# Patient Record
Sex: MALE | Race: ASIAN | URBAN - METROPOLITAN AREA
[De-identification: names, ages, dates, MRNs, and addresses within clinical notes are randomized per-mention and may not be internally consistent; named-entity substitution may affect disease eponyms.]

---

## 2017-06-06 ENCOUNTER — OFFICE VISIT (OUTPATIENT)
Dept: INTERNAL MEDICINE CLINIC | Facility: CLINIC | Age: 27
End: 2017-06-06

## 2017-06-06 VITALS
DIASTOLIC BLOOD PRESSURE: 78 MMHG | HEART RATE: 62 BPM | TEMPERATURE: 98 F | WEIGHT: 180 LBS | BODY MASS INDEX: 28.25 KG/M2 | SYSTOLIC BLOOD PRESSURE: 114 MMHG | HEIGHT: 67 IN

## 2017-06-06 DIAGNOSIS — Z01.89 LABORATORY TEST: ICD-10-CM

## 2017-06-06 DIAGNOSIS — J30.1 SEASONAL ALLERGIC RHINITIS DUE TO POLLEN: ICD-10-CM

## 2017-06-06 DIAGNOSIS — J39.2 THROAT IRRITATION: ICD-10-CM

## 2017-06-06 DIAGNOSIS — Z00.00 ANNUAL PHYSICAL EXAM: Primary | ICD-10-CM

## 2017-06-06 PROCEDURE — 99385 PREV VISIT NEW AGE 18-39: CPT | Performed by: INTERNAL MEDICINE

## 2017-06-06 NOTE — PROGRESS NOTES
HPI:    Patient ID: Michelle Elizabeth is a 27year old male. HPI  Pt comes in for first time to establish care.  Pt over all feels ok, except lately he has had some throat irritation, usually in the morning when he gets up he coughs and clears throat, t Normal range of motion. Neck supple. No thyromegaly present. Cardiovascular: Normal rate, regular rhythm, normal heart sounds and intact distal pulses. Exam reveals no friction rub. No murmur heard.   Pulmonary/Chest: Effort normal and breath sounds n

## 2017-06-06 NOTE — PATIENT INSTRUCTIONS
ASSESSMENT/PLAN:   Annual physical exam  (primary encounter diagnosis)- exam is ok  Laboratory test- will get labs and will let you know of results   Throat irritation- this could be maybe allergies but also could be GERD,` watch diet, no eating late at ni

## 2020-02-26 ENCOUNTER — WALK IN (OUTPATIENT)
Dept: URGENT CARE | Age: 30
End: 2020-02-26

## 2020-02-26 DIAGNOSIS — Z23 NEED FOR TDAP VACCINATION: ICD-10-CM

## 2020-02-26 DIAGNOSIS — Z23 NEED FOR IMMUNIZATION AGAINST INFLUENZA: Primary | ICD-10-CM

## 2020-02-26 PROCEDURE — 90471 IMMUNIZATION ADMIN: CPT | Performed by: NURSE PRACTITIONER

## 2020-02-26 PROCEDURE — 90686 IIV4 VACC NO PRSV 0.5 ML IM: CPT | Performed by: NURSE PRACTITIONER

## 2020-02-26 PROCEDURE — 90472 IMMUNIZATION ADMIN EACH ADD: CPT | Performed by: NURSE PRACTITIONER

## 2020-02-26 PROCEDURE — 90715 TDAP VACCINE 7 YRS/> IM: CPT | Performed by: NURSE PRACTITIONER

## 2023-02-02 ENCOUNTER — PATIENT OUTREACH (OUTPATIENT)
Dept: CASE MANAGEMENT | Age: 33
End: 2023-02-02

## 2023-02-03 NOTE — PROCEDURES
The office order for PCP request is Approved and finalized on February 2, 2023.     Thanks,  [facility] [employer]

## 2024-12-24 ENCOUNTER — HOSPITAL ENCOUNTER (EMERGENCY)
Facility: HOSPITAL | Age: 34
Discharge: HOME | End: 2024-12-25
Attending: EMERGENCY MEDICINE
Payer: COMMERCIAL

## 2024-12-24 DIAGNOSIS — S61.212A LACERATION OF RIGHT MIDDLE FINGER WITHOUT FOREIGN BODY WITHOUT DAMAGE TO NAIL, INITIAL ENCOUNTER: Primary | ICD-10-CM

## 2024-12-24 PROCEDURE — 99283 EMERGENCY DEPT VISIT LOW MDM: CPT

## 2024-12-24 PROCEDURE — 12001 RPR S/N/AX/GEN/TRNK 2.5CM/<: CPT

## 2024-12-24 PROCEDURE — 90471 IMMUNIZATION ADMIN: CPT

## 2024-12-24 RX ORDER — LIDOCAINE HYDROCHLORIDE 10 MG/ML
10 INJECTION, SOLUTION EPIDURAL; INFILTRATION; INTRACAUDAL; PERINEURAL ONCE
Status: COMPLETED | OUTPATIENT
Start: 2024-12-24 | End: 2024-12-25

## 2024-12-25 VITALS
HEIGHT: 68 IN | BODY MASS INDEX: 27.28 KG/M2 | HEART RATE: 69 BPM | RESPIRATION RATE: 19 BRPM | WEIGHT: 180 LBS | OXYGEN SATURATION: 97 % | SYSTOLIC BLOOD PRESSURE: 138 MMHG | TEMPERATURE: 97 F | DIASTOLIC BLOOD PRESSURE: 75 MMHG

## 2024-12-25 NOTE — ED PROVIDER NOTES
Patient Seen in: [facility] Emergency Department    History     Chief Complaint   Patient presents with    Laceration     Stated Complaint: Laceration    HPI    HPI: Bg Saha is a 34 year old male who presents after an injury to R third digit  that occurred prior to arrival when pt accidentally cut himself with a kitchen knife.  He irrigated it and then came to the ER.  Unsure of last tetanus.  Reports mild pain to the laceration.  No other injuries.  No numbness or tingling in his hand.    No past medical history on file.    No past surgical history on file.         No family history on file.    Social History     Socioeconomic History    Marital status:        Review of Systems    Positive for stated complaint: Laceration  Other systems are as noted in HPI.  Constitutional and vital signs reviewed.      All other systems reviewed and negative except as noted above.    PSFH elements reviewed from today and agreed except as otherwise stated in HPI.    Physical Exam     ED Triage Vitals [12/24/24 2223]   /90   Pulse 74   Resp 18   Temp 97.2 °F (36.2 °C)   Temp src Temporal   SpO2 96 %   O2 Device None (Room air)       Current:/75   Pulse 69   Temp 97.2 °F (36.2 °C) (Temporal)   Resp 19   Ht 172.7 cm (5' 8\")   Wt 81.6 kg   SpO2 97%   BMI 27.37 kg/m²         Physical Exam      MENTAL STATUS: Alert, oriented, and cooperative. No focal deficit  HEAD: Atraumatic  EXTREMITIES: There is an approximately one 5 cm linear and superficial laceration just distal to the right middle finger knuckle on the dorsum of the hand without any active bleeding, no pulsatile bleeding.  No surrounding hematoma.  There is no bony tenderness throughout the right middle finger or any of the fingers or thumb of the right hand nor the palm or wrist of the right hand with full intact range of motion of all joints of the fingers and thumb of the right hand and the right wrist.  +2 radial pulse in RUE    NEURO:Sensation to touch is intact.  Radial, median, ulnar strength and sensory distribution intact to the right hand.  SKIN: laceration as described in extremity section   PSYCH: Normal affect. Calm and cooperative.      ED Course   Labs Reviewed - No data to display    34-year-old presents with laceration to the distal portion of the right middle finger.  Bleeding controlled.  It is not deep, states was not using any loose metal or other materials states was cutting meat when this occurred.  I have low suspicion for fracture or retained foreign body based on patient's exam and history.  Will proceed with laceration repair.  Will update tetanus.  Plan for discharge with outpatient follow-up and suture removal in 7 days, wound cares discussed with patient.  He is comfortable with the discharge plan.  Laceration repair:    Verbal consent was obtained from the patient.  The 1.5 cm laceration located R middle finger was anesthetized in the usual fashion with 9 mL 1% lido without epinephrine in ring block. The wound was scrubbed, draped and explored.   There were no deep structures involved.  No tendon injury was identified.  The wound was repaired with 2 5-0 nylon sutures in simple interrupted fashion .  The wound repair was simple.  The procedure was performed by myself.          Disposition and Plan     Clinical Impression:  1. Laceration of right middle finger without foreign body without damage to nail, initial encounter        Disposition:  Discharge    Follow-up:  [facility] Emergency Department  [address] [id number] [phone]  Go in 1 week(s)  For suture removal, For wound re-check    Norris Avilez MD  [address] [facility] [id number] [phone]    Schedule an appointment as soon as possible for a visit in 2 day(s)  As needed      Medications Prescribed:  There are no discharge medications for this patient.      Ifrah Pedraza DO  Attending Physician  Emergency  Medicine

## 2024-12-25 NOTE — DISCHARGE INSTRUCTIONS
Thank you for seeking care at [facility] Emergency Department.  You have been seen and evaluated after an injury that resulted in a laceration.     We reviewed the results from your visit in the emergency department.   Please read the instructions provided   If given prescriptions, take as instructed.    Please return to the emergency department or to your primary care doctor in 7 days to have your sutures/staples removed.     Return to the emergency department earlier if you develop fevers, if you see pus coming from the wound, or if you develop increasing redness around the wound as these can be signs of wound infection.     Please keep the wound covered in the provided dressing for the first 24 hours. After that, you may remove the dressing and wash the area with normal soap and water. Please avoid aggressive scrubbing as this may disrupt the sutures/staples. Please keep the area clean and dry. You can use normal bandages or gauze/tape as needed to keep the wound protected.   After the wound has healed, use sunscreen to avoid significant scarring.     Remember, your care process does not end after your visit today. Please follow-up with your doctor within 1-2 days for a follow-up check to ensure you are  improving, to see if you need any further evaluation/testing, or to evaluate for any alternate diagnoses.    Please return to the emergency department for any fevers, if you see pus coming from the wound, increasing redness, discoloration, increasing pain, wound  open, numbness, tingling or weakness, new or worsening symptoms as discussed as these could be signs of more serious medical illness.    We hope you feel better.

## 2024-12-25 NOTE — ED INITIAL ASSESSMENT (HPI)
Pt arrives through triage with       complaints of lac to the right 3rd digit, pt cut himself with a knife    Pt unsure of last tetanus shot

## 2025-06-11 ENCOUNTER — OFFICE VISIT (OUTPATIENT)
Dept: INTERNAL MEDICINE CLINIC | Facility: CLINIC | Age: 35
End: 2025-06-11
Payer: COMMERCIAL

## 2025-06-11 VITALS
BODY MASS INDEX: 28.04 KG/M2 | TEMPERATURE: 98 F | WEIGHT: 185 LBS | OXYGEN SATURATION: 99 % | HEIGHT: 68 IN | HEART RATE: 78 BPM | DIASTOLIC BLOOD PRESSURE: 84 MMHG | SYSTOLIC BLOOD PRESSURE: 128 MMHG

## 2025-06-11 DIAGNOSIS — E66.3 OVERWEIGHT (BMI 25.0-29.9): ICD-10-CM

## 2025-06-11 DIAGNOSIS — Z00.00 HEALTH MAINTENANCE EXAMINATION: Primary | ICD-10-CM

## 2025-06-11 DIAGNOSIS — D22.9 MULTIPLE BENIGN NEVI: ICD-10-CM

## 2025-06-11 NOTE — PROGRESS NOTES
FAMILY MEDICINE CLINIC NOTE    HPI  Norris Saha is a 34 year old male presenting for physical    #Health Maintenance  -Diet: Skips breakfast. Well rounded. But larger portions and snacking.   -Exercise: 3 days a week - peleton, pushups, pull ups, squats. Goal 78007 steps a day.   -Lung cancer screen: Not indicated  -Colon cancer screen: Not indicated  -Prostate cancer screen: Not indicated  -Aortic aneurysm screen: Not indicated  -Statin:  Will check lipid panel  -ASA: Not indicated  -HIV screen: Indicated  -Hep C screen: Indicated  -Gonorrhea/chlamydia:  Not indicated  -Syphillis: Not indicated  -TB: Not indicated  -Tobacco/alcohol: Per below  -Depression: PHQ-2 score of 0 (score >/= 3 do PHQ-9)  -Advanced Directive: Indicated    #Immunizations  -Tdap: 12/2024  -Flu shot: Not indicated - not season  -PCV13: Not indicated   -PCV20: Not indicated   -PPSV23: Not indicated   -HPV: Not indicated  -RZV (preferred) or VZL: Not indicated   -RSV: Not indicated   -COVID: Indicated    #Patient Care Team  Patient Care Team:  Adama Cardenas MD as PCP - General (Family Medicine)    ROS  GENERAL: No fever/chills, no recent weight loss   HEENT: No visual changes, no changes in hearing, no sore throats  NECK: No pain, no swelling  RESP: No cough, no SOB  CV: No chest pain, no palpitations  GI: No abd pain, no N/V/D  MSK: No edema, no pain  SKIN: No new rashes  NEURO: No numbness, no tingling, no HA    HEALTH MAINTENANCE CHECKLIST  Health Maintenance Topics with due status: Overdue       Topic Date Due    Annual Physical Never done    COVID-19 Vaccine 09/01/2024    Annual Depression Screening 01/01/2025       ALLERGIES  Allergies[1]    MEDICATIONS  Current Medications[2]    ACTIVE PROBLEM  Problem List[3]    PAST MEDICAL HISTORY  Past Medical History[4]    PAST SOCIAL HISTORY  Social History     Socioeconomic History    Marital status:      Spouse name: Not on file    Number of children: Not on file    Years of  education: Not on file    Highest education level: Not on file   Occupational History    Not on file   Tobacco Use    Smoking status: Some Days     Types: Cigars    Smokeless tobacco: Never   Vaping Use    Vaping status: Never Used   Substance and Sexual Activity    Alcohol use: Yes     Comment: Occasional - a couple beers a week    Drug use: No    Sexual activity: Yes     Partners: Female   Other Topics Concern    Not on file   Social History Narrative    Relationships:  - Lashay    Children: Letty (5F), Jing (18m F)    Pets: None    School: N/A    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 [place].     Interests: Enjoys golf (biggest hobby), staying active.     Spiritual: Not Roman Catholic, not spiritual     Social Drivers of Health     Food Insecurity: Not on file   Transportation Needs: Not on file   Stress: Not on file   Housing Stability: Not on file       PAST SURGICAL HISTORY  Past Surgical History[5]    PAST FAMILY HISTORY  Family History[6]    PHYSICAL EXAM  Vitals:    06/11/25 0817   BP: 128/84   Pulse: 78   Temp: 98.4 °F (36.9 °C)   SpO2: 99%   Weight: 185 lb (83.9 kg)   Height: 5' 8\" (1.727 m)      Body mass index is 28.13 kg/m².    GENERAL: NAD  HEENT: Moist mucous membranes, no tonsillar swelling or erythema, PERRLA bilat, TM translucent and non-bulging  NECK: Supple, non-tender  RESP: CTAB, no wheezing, no rales, no rhonchi  CV: RRR, no murmurs  GI: Soft, non-distended, non-tender, no guarding, no rebound, no masses  MSK: No edema  SKIN: Warm and dry, scattered small brown macules throughout the the back, extremities, chest and abdomen  NEURO: Answering questions appropriately    LABS  No results found for: \"WBC\", \"HGB\", \"HCT\", \"PLT\", \"NEPERCENT\", \"LYPERCENT\", \"MOPERCENT\", \"EOPERCENT\", \"BAPERCENT\", \"NE\", \"LYMABS\", \"MOABSO\", \"EOABSO\", \"BAABSO\", \"REITCPERCENT\"    No results found for: \"NA\", \"K\", \"CL\", \"CO2\", \"ANIONGAP\",  \"BUN\", \"CREATSERUM\", \"BUNCREA\", \"GLU\", \"CA\", \"OSMOCALC\", \"GFRNAA\", \"GFRAA\", \"ALT\", \"AST\", \"ALKPHO\", \"BILT\", \"TP\", \"ALB\", \"GLOBULIN\", \"ELECTAG\", \"FASTING\"      No results found for: \"CHOLEST\", \"TRIG\", \"HDL\", \"LDL\", \"VLDL\", \"TCHDLRATIO\", \"NONHDLC\", \"CHOLHDLRATIO\", \"CALCNONHDL\"     DIAGNOSTICS    ASSESSMENT/PLAN  Problem List Items Addressed This Visit          Endocrine and Metabolic    Overweight (BMI 25.0-29.9)    Healthy diet and exercise advised.  Check labs.         Relevant Orders    Comp Metabolic Panel (14)    Hemoglobin A1C    Lipid Panel    TSH W Reflex To Free T4       Skin    Multiple benign nevi    Scattered brown macules throughout the body.  Advised monitoring  Use sunscreen             Health Encounters    Health maintenance examination - Primary    Exercise and diet advised.  CBC, CMP, lipid panel, Hba1c, TSH, HIV screen, hepatitis C screen  COVID vaccine advised.  Advanced directive information provided.  Cigar smoking cessation advised         Relevant Orders    CBC With Differential With Platelet    Comp Metabolic Panel (14)    HCV Antibody    Hemoglobin A1C    HIV Ag/Ab Combo    Lipid Panel    TSH W Reflex To Free T4    Tobacco Use Counseling 3-10 Min [92805]   Tobacco cessation counseling for <3 minutes.    Return in about 1 year (around 6/11/2026) for physical.    Adama Cardenas MD  Family Medicine         [1] No Known Allergies  [2]   No current outpatient medications on file.   [3]   Patient Active Problem List  Diagnosis    Health maintenance examination    Overweight (BMI 25.0-29.9)    Multiple benign nevi   [4] History reviewed. No pertinent past medical history.  [5] History reviewed. No pertinent surgical history.  [6]   Family History  Problem Relation Age of Onset    Glaucoma Mother     Arthritis Mother     Heart Disease Father 45        CABG    Hypertension Father     Hyperlipidemia Father     No Known Problems Sister     No Known Problems Brother     No Known Problems Brother      Cancer Maternal Grandmother         Unknown type    No Known Problems Maternal Grandfather     No Known Problems Paternal Grandmother     Heart Attack Paternal Grandfather     Colon Cancer Neg     Prostate Cancer Neg

## 2025-06-11 NOTE — PATIENT INSTRUCTIONS
PATIENT INSTRUCTIONS    Thank you for seeing me today, it was a pleasure taking care of you.  Please check out at the  and schedule a follow up appointment.  Return in about 1 year (around 6/11/2026) for physical.  Please remember that the preferred vandana period for appointments is 5 minutes. This is to help maximize the amount of time that we can spend together at our visits.    Please get your labs drawn at your preferred lab.  The following imaging studies were ordered: None  Please also follow up with the following specialists: None  Please fill out the advance directive information (power of  documents) and bring a copy to our clinic.  Healthy diet and exercise    Best,   Dr. Cardenas        [place] [facility] AND IMAG[facility] INFORMATION    Here are some lab and imaging locations available to you. Please note that some of the times and availabilities are subject to change. Please refer to the [email or web address] webpage for the most recent updates. There are also additional locations that can be found on the [email or web address] webpage.    To schedule a lab appointment, please call [phone].    To schedule an imaging appointment, please call [phone], option 2      [address] [address]    Lab Hours  Monday - Friday 7:30am - 5pm  Saturday 6:30am - 3pm    X-ray Hours  Call [phone] for hours or to schedule      [facility]  [address] [place] [address]    Lab Hours  Monday - Friday 6:30am - 8pm  Saturday 6:30am - 3pm  Sunday 6:30am - 3pm    X-ray Hours  Call [phone] for hours or to schedule      [facility]  [address] [place] [address]    Lab Hours  Monday - Friday 7:30am - 5pm  Saturday 7:30am - 11:30am    X-ray Hours  None at this location      [facility] [address]  [place] [address]    Lab Hours  Monday - Friday 8am - 12pm    X-ray Hours  Call [phone] for hours or to schedule      [facility] [facility] [place]  [address] [place] [address]    Lab Hours  Monday - Friday 7:30am - 5pm  Saturday 6:30am - 3pm    X-ray Hours  Call [phone] for hours or to schedule      [facility] [place]  [address]  [place] [address]    Lab Hours  Monday - Friday 7:30am - 4pm (closed 12:15pm - 1pm)    X-ray Hours  Call [phone] for hours or to schedule      [facility] [place]  [address]  [place] [address]    Lab Hours  Monday - Friday 8am - 4:30pm (closed 12:15pm - 1pm)    X-ray Hours  None at this location        Quitting Smoking    Quitting smoking is the most important step you can take to improve your health. We're glad you have set a goal to improve your health.    Quit Smoking Resources    In addition to medications, use the STAR plan to help you successfully quit.   Stick with your quit date!   Tell friends, family, and coworkers your quit date. Request their understanding and support.  Anticipate and prepare for challenges. Some examples are withdrawal symptoms, being around others who smoke, and drinking alcohol.  Remove all tobacco products and paraphernalia from your environment. Make your home and vehicles smoke-free.    Free resources for additional support:  National tobacco quitline: 1-800-QUIT-NOW (1-452.356.1263).  SmokefreeTXT is a free text program to assist you in quitting. Visit https://www.smokefree.gov/smokefreetxt for more information.  Feel free to call your care manager at ([phone]) for additional support.

## 2025-06-11 NOTE — ASSESSMENT & PLAN NOTE
Exercise and diet advised.  CBC, CMP, lipid panel, Hba1c, TSH, HIV screen, hepatitis C screen  COVID vaccine advised.  Advanced directive information provided.  Cigar smoking cessation advised

## (undated) NOTE — MR AVS SNAPSHOT
[address] [facility] [address]  [phone]               Thank you for choosing us for your health care visit with Makeda Nicole MD.  We are glad to serve you and happy to provide you with this summary of your visit. Annual physical exam  (primary encounter diagnosis)- exam is ok  Laboratory test- will get labs and will let you know of results   Throat irritation- this could be maybe allergies but also could be GERD,` watch diet, no eating late at night, or drinking al 2 ½ hours per week – spread out over time Use a derick to keep you motivated   Don’t forget strength training with weights and resistance Set goals and track your progress   You don’t need to join a gym. Home exercises work great.  Put more priority on exe